# Patient Record
Sex: MALE | Race: NATIVE HAWAIIAN OR OTHER PACIFIC ISLANDER
[De-identification: names, ages, dates, MRNs, and addresses within clinical notes are randomized per-mention and may not be internally consistent; named-entity substitution may affect disease eponyms.]

---

## 2019-02-10 ENCOUNTER — HOSPITAL ENCOUNTER (EMERGENCY)
Dept: HOSPITAL 14 - H.ER | Age: 54
Discharge: HOME | End: 2019-02-10
Payer: SELF-PAY

## 2019-02-10 VITALS — TEMPERATURE: 99.4 F | HEART RATE: 87 BPM | DIASTOLIC BLOOD PRESSURE: 80 MMHG | SYSTOLIC BLOOD PRESSURE: 144 MMHG

## 2019-02-10 VITALS — RESPIRATION RATE: 16 BRPM

## 2019-02-10 VITALS — OXYGEN SATURATION: 98 %

## 2019-02-10 DIAGNOSIS — B35.3: Primary | ICD-10-CM

## 2019-02-10 NOTE — ED PDOC
HPI: Psych/Substance Abuse


Time Seen by Provider: 02/10/19 01:30


Chief Complaint (Nursing): Medical Clearance


History Per: Patient


Additional Complaint(s): 





Pt. states he was sleeping outside and police came up to him then called the 

ambulance. Pt. states for the past several months he's had a pruritic rash on 

the R foot. Used Desitin in the past without relief. Denies fever, pain, trauma,

chest pain, SOB. 





Past Medical History


Reviewed: Historical Data, Nursing Documentation, Vital Signs


Vital Signs: 





                                Last Vital Signs











Temp  98.9 F   02/10/19 01:20


 


Pulse  85   02/10/19 01:20


 


Resp  16   02/10/19 01:20


 


BP  165/95 H  02/10/19 01:20


 


Pulse Ox  98   02/10/19 01:20














- Medical History


PMH: No Chronic Diseases





- Family History


Family History: States: No Known Family Hx





- Allergies


Allergies/Adverse Reactions: 


                                    Allergies











Allergy/AdvReac Type Severity Reaction Status Date / Time


 


No Known Allergies Allergy   Verified 02/10/19 01:27














Review of Systems


ROS Statement: Except As Marked, All Systems Reviewed And Found Negative


Skin: Positive for: Rash





Physical Exam





- Physical Exam


Appears: Positive for: Well, Non-toxic, No Acute Distress


Skin: Positive for: Normal Color, Warm, Rash (annular scaly plaque on dorsal 

surface of R foot without break in skin integrity or erythema)


Eye Exam: Positive for: Normal appearance


Pulses-Dorsalis Pedis (L): 2+


Pulses-Dorsalis Pedis (R): 2+


Gastrointestinal/Abdominal: Positive for: Soft.  Negative for: Tenderness


Extremity: Positive for: Other (no interdigital maceration)


Neurologic/Psych: Positive for: Alert, Oriented (x3)





- ECG


O2 Sat by Pulse Oximetry: 98





- Progress


ED Course And Treament: 





Nystatin cream given in ED. 





Disposition





- Clinical Impression


Clinical Impression: 


 Tinea pedis








- Patient ED Disposition


Is Patient to be Admitted: No





- Disposition


Referrals: 


Podiatry Clinic [Outside]


Disposition: Routine/Home


Disposition Time: 02:09


Condition: STABLE


Additional Instructions: 


Apply Nystatin ointment to rash on feet BID for 10 days.


Follow up with podiatry clinic for further evaluation





MONIQUE LEES, thank you for letting us take care of you today. Your provider was 

Cynthia Dorsey MD and you were treated for PSYCH EVAL. The emergency 

medical care you received today was directed at your acute symptoms. If you were

 prescribed any medication, please fill it and take as directed. It may take 

several days for your symptoms to resolve. Return to the Emergency Department if

 your symptoms worsen, do not improve, or if you have any other problems.





Please contact your doctor or call one of the physicians/clinics you have been 

referred to that are listed on the Patient Visit Information form that is 

included in your discharge packet. Bring any paperwork you were given at 

discharge with you along with any medications you are taking to your follow up 

visit. Our treatment cannot replace ongoing medical care by a primary care 

provider outside of the emergency department.





Thank you for allowing the [facility] team to be part of your care today.








If you had an X-Ray or CT scan: A Radiologist will review the ED reading if any 

change in treatment is needed we will contact you.***





If you had a blood, urine, or wound culture: It will take several days for the 

results, if any change in treatment is needed we will contact you.***





If you had an STI test: It will take 48 hours for the results. Please call after

 1 week if you have not heard back.***


Instructions:  Ringworm (DC)


Forms:  [facility] (English)